# Patient Record
(demographics unavailable — no encounter records)

---

## 2024-10-09 NOTE — DISCUSSION/SUMMARY
[FreeTextEntry1] : 38 YEAR OLD FEMALE LMP 7/29/2024 PRESENTS FOR FOLLOW UP VISIT FOR AMENORRHEA AND PREGNANCY TO BEGIN PRENATAL CARE. PT LAWST SEEN 9/19/2024 AT WHICH TIME TV US WAS PERFORMED AND DEMONSTARTED AN INTRAUTERINE PRENANCY  7 WKS 1 DAY .  BLOOD WORK DONE LATER THAT DAY WAS RESULTED WITH HCG 15231 WITH PROGESTERONE LEVEL 16.5. PT IS TAKING PRENATAL VIATAMINS. + TIRED; + NAUSEA; +URINARY FREQUENCY.  PE;BP 88/56; TEMP 97.2; WEIGHT 117 LBS ;HEIGHT 5'2"       ABDOMEN; SOFT, NO MASSES; NO TENDERNESS TO PALPATION       PELVIC; NORMAL EXTERNAL GENITALIA                      NORMAL URETHRAL MEATUS                      BIMANUAL EXAMINATION WITH UTERUS ENLARGED TO 9-10 WKS GESTATIONAL SIZE                  TV US PERFORMED AND NOW DEMONSTRATING A MUCH LARGER GESTATIONAL SAC WITH A WELL DEFINED YOLK SAC AND A FETAL POLE WITH CR MEASUREMENT OF 10WK CORRESPONDING TO  AN EDC OF 5/91384 ( SAME AS EDC BY LMP) WITH FETAL HEART ACTIVITY  BPM. BOTH OVARIES VISUALIZED AND NORMAL WITH SEVERAL  SUBCM CYSTS IN EACH. ADNEXA NEGATIVE AND NO  FREE FLUID IN CUL DE SAC.  IMP; SECONDARY AMENORHEA          CYSTOCELE          IUP 10 WKS          AMA  PLAN; CONTINUE PRENATAL VITAMINS;             RETURN TO OFFICE IN 3WKS FOR PRNATAL VISIT, AND 1ST TRIMESTER US WITH NT AND                WILL SEND FOR ROUTINE PRENTAL BLOOD WORK AND NIPT ANEUPLOIDY TESTING.

## 2025-05-22 NOTE — HISTORY OF PRESENT ILLNESS
[Delivery Date: ___] : on [unfilled] [Primary C/S] : delivered by  section [Male] : Delivery History: baby boy [Wt. ___] : weighing [unfilled] [None] : No associated symptoms are reported [Doing Well] : is doing well [No Sign of Infection] : is showing no signs of infection [Excellent Pain Control] : has excellent pain control [de-identified] : stat breech fully dilated, 40w0d [de-identified] : Minimal pain and bleeding. Nursing without issue. Ambulating, voiding, +BM [de-identified] : Steris removed, c/d/i, no erythema, induration, drainage or dehiscence  [de-identified] : 37 yo P9, s/p primary  breech presentation, doing well [de-identified] : return to office 4wks PP visit or PRN

## 2025-06-24 NOTE — HISTORY OF PRESENT ILLNESS
[Delivery Date: ___] : on [unfilled] [Primary C/S] : delivered by  section [Male] : Delivery History: baby boy [Wt. ___] : weighing [unfilled] [Normal] : the vagina was normal [Doing Well] : is doing well [No Sign of Infection] : is showing no signs of infection [Excellent Pain Control] : has excellent pain control [None] : None [de-identified] : stat  breech presentation, labor 40w0d [de-identified] : Stopped bleeding, nursing without issue, ambulating, voiding, +BM, no pain [de-identified] : well healed pfannenstiel skin incision, c/d/i, no erythema, induration or drainage [de-identified] : 38 yo P9, s/p stat  breech presentation, doing well [de-identified] : Resume all regular activity. Will use films for contraception. Return annual exam or PRN. Discussed higher likelihood of  if interpregnancy interval ~18months